# Patient Record
Sex: FEMALE | Race: WHITE | NOT HISPANIC OR LATINO | Employment: OTHER | URBAN - METROPOLITAN AREA
[De-identification: names, ages, dates, MRNs, and addresses within clinical notes are randomized per-mention and may not be internally consistent; named-entity substitution may affect disease eponyms.]

---

## 2019-09-18 ENCOUNTER — OFFICE VISIT (OUTPATIENT)
Dept: CARDIOLOGY CLINIC | Facility: CLINIC | Age: 24
End: 2019-09-18
Payer: MEDICARE

## 2019-09-18 VITALS
DIASTOLIC BLOOD PRESSURE: 74 MMHG | OXYGEN SATURATION: 98 % | HEIGHT: 61 IN | BODY MASS INDEX: 24.22 KG/M2 | HEART RATE: 121 BPM | WEIGHT: 128.3 LBS | SYSTOLIC BLOOD PRESSURE: 102 MMHG

## 2019-09-18 DIAGNOSIS — R07.89 CHEST DISCOMFORT: Primary | ICD-10-CM

## 2019-09-18 DIAGNOSIS — R06.00 DYSPNEA ON EXERTION: ICD-10-CM

## 2019-09-18 PROCEDURE — 99203 OFFICE O/P NEW LOW 30 MIN: CPT | Performed by: INTERNAL MEDICINE

## 2019-09-18 PROCEDURE — 93000 ELECTROCARDIOGRAM COMPLETE: CPT | Performed by: INTERNAL MEDICINE

## 2019-09-18 RX ORDER — QUETIAPINE 150 MG/1
150 TABLET, FILM COATED, EXTENDED RELEASE ORAL
COMMUNITY

## 2019-09-18 RX ORDER — SERTRALINE HYDROCHLORIDE 25 MG/1
25 TABLET, FILM COATED ORAL DAILY
COMMUNITY

## 2019-09-18 RX ORDER — BUSPIRONE HYDROCHLORIDE 10 MG/1
10 TABLET ORAL 3 TIMES DAILY
COMMUNITY

## 2019-09-18 NOTE — PROGRESS NOTES
Cardiology Consultation     Regina Daniel  [id number]  1995  [facility] [place]  [address] [id number]      1  Chest discomfort  POCT ECG   2  Dyspnea on exertion         Discussion/Summary:  1  Atypical chest pain  2  Dyspnea on exertion  3  History of Barbara-Parkinson-White S/[place] [facility] [place] [facility]    -will check exercise stress test and transthoracic echocardiogram to further evaluate patient once congestion and upper respiratory symptoms have improved  -will have patient attempt to retrieve records from [facility] about her ablation  -will see patient in approximately 1 month after testing has been completed or sooner if possible  -counseled patient that if she were to have any worsening or alarm type symptoms she should seek emergency medical care immediately  -counseled patient on the importance of maintaining her sobriety and eventual tobacco cessation      History of Present Illness:  Atypical chest pain/dyspnea on exertion  -patient is a 64-year-old female past medical history significant for Barbara-Parkinson-White S/P ablation in 2013 after having symptomatic episodes of syncope, recent discontinuation of heroin use, current tobacco use 1 pack per day who presents to the office today for evaluation of chest discomfort  Patient notes shortness of breath with walking up and down stairs that began approximately 21 days ago when she entered rehab  She notes her chest discomfort began approximately the same time  It is located over the left anterior chest wall and does not radiate  She notes that the pain is worse with palpation  She notes the pain has been pretty much constant for the last 21 days since she has stopped using heroin    She notes no alleviating symptoms and states that has been a 6/10 intensity for the past 21 days   -she notes that over the past couple days she has had worsening head congestion which has made her shortness of breath on exertion worse but has even made her chest discomfort worse  She denies any fevers or chills but does note intermittent sweating issues and denies any palpitations  She denies any abdominal symptoms of nausea/vomiting/diarrhea at this time  She denies any lower extremity edema or orthopnea and states that she normally sleeps on her side with 1 pillow and this is not changed for several years        Patient Active Problem List   Diagnosis    Chest discomfort    Dyspnea on exertion     Past Medical History:   Diagnosis Date    Anxiety     Depression     WPW (Barbara-Parkinson-White syndrome)      Social History     Socioeconomic History    Marital status: Single     Spouse name: Not on file    Number of children: Not on file    Years of education: Not on file    Highest education level: Not on file   Occupational History    Not on file   Social Needs    Financial resource strain: Not on file    Food insecurity:     Worry: Not on file     Inability: Not on file    Transportation needs:     Medical: Not on file     Non-medical: Not on file   Tobacco Use    Smoking status: Current Every Day Smoker     Types: Cigarettes    Smokeless tobacco: Never Used   Substance and Sexual Activity    Alcohol use: Not on file    Drug use: Not on file    Sexual activity: Not on file   Lifestyle    Physical activity:     Days per week: Not on file     Minutes per session: Not on file    Stress: Not on file   Relationships    Social connections:     Talks on phone: Not on file     Gets together: Not on file     Attends Jew service: Not on file     Active member of club or organization: Not on file     Attends meetings of clubs or organizations: Not on file     Relationship status: Not on file    Intimate partner violence:     Fear of current or ex partner: Not on file     Emotionally abused: Not on file     Physically abused: Not on file     Forced sexual activity: Not on file   Other Topics Concern    Not on file   Social History Narrative    Not on file      No family history on file  Past Surgical History:   Procedure Laterality Date    AV NODE ABLATION         Current Outpatient Medications:     busPIRone (BUSPAR) 10 mg tablet, Take 10 mg by mouth 3 (three) times a day, Disp: , Rfl:     QUEtiapine (SEROQUEL XR) 150 mg 24 hr tablet, Take 150 mg by mouth daily at bedtime, Disp: , Rfl:     sertraline (ZOLOFT) 25 mg tablet, Take 25 mg by mouth daily, Disp: , Rfl:   No Known Allergies      Labs:  No results found for any previous visit  Imaging: No results found  ECG:  Sinus tachycardia heart rate 121 with incomplete right bundle-branch block    Review of Systems:  Review of Systems   Constitutional: Positive for fatigue  Negative for diaphoresis and fever  HENT: Positive for postnasal drip, rhinorrhea and sinus pressure  Negative for trouble swallowing and voice change  Eyes: Negative for pain and redness  Respiratory: Positive for cough, chest tightness, shortness of breath and wheezing  Cardiovascular: Positive for chest pain  Negative for palpitations and leg swelling  Gastrointestinal: Negative for abdominal pain and constipation  Genitourinary: Negative for dysuria  Musculoskeletal: Negative for neck pain and neck stiffness  Skin: Negative for rash  Neurological: Negative for dizziness, syncope and light-headedness  Psychiatric/Behavioral: Negative for agitation and confusion  All other systems reviewed and are negative  Vitals:    09/18/19 1406   BP: 102/74   BP Location: Right arm   Patient Position: Sitting   Cuff Size: Standard   Pulse: (!) 121   SpO2: 98%   Weight: 58 2 kg (128 lb 4 8 oz)   Height: 5' 1" (1 549 m)     Vitals:    09/18/19 1406   Weight: 58 2 kg (128 lb 4 8 oz)     Height: 5' 1" (154 9 cm)     Physical Exam:  Physical Exam   Constitutional: She is oriented to person, place, and time   She appears well-developed and well-nourished  HENT:   Head: Normocephalic and atraumatic  Eyes: Right eye exhibits no discharge  Left eye exhibits no discharge  Neck: No JVD present  No tracheal deviation present  Cardiovascular: Regular rhythm  Tachycardic   Pulmonary/Chest: Effort normal  She has wheezes  She exhibits tenderness  Abdominal: Soft  Bowel sounds are normal  There is no tenderness  Musculoskeletal: She exhibits no edema or tenderness  Neurological: She is alert and oriented to person, place, and time  Skin: Skin is warm and dry  Psychiatric: She has a normal mood and affect